# Patient Record
Sex: FEMALE
[De-identification: names, ages, dates, MRNs, and addresses within clinical notes are randomized per-mention and may not be internally consistent; named-entity substitution may affect disease eponyms.]

---

## 2019-07-05 ENCOUNTER — HOSPITAL ENCOUNTER (INPATIENT)
Dept: HOSPITAL 56 - MW.OB | Age: 19
LOS: 2 days | Discharge: HOME | End: 2019-07-07
Attending: OBSTETRICS & GYNECOLOGY | Admitting: OBSTETRICS & GYNECOLOGY
Payer: MEDICAID

## 2019-07-05 DIAGNOSIS — O34.211: Primary | ICD-10-CM

## 2019-07-05 DIAGNOSIS — E66.9: ICD-10-CM

## 2019-07-05 DIAGNOSIS — Z3A.39: ICD-10-CM

## 2019-07-05 RX ADMIN — KETOROLAC TROMETHAMINE SCH MG: 30 INJECTION, SOLUTION INTRAMUSCULAR at 15:01

## 2019-07-05 RX ADMIN — KETOROLAC TROMETHAMINE SCH MG: 30 INJECTION, SOLUTION INTRAMUSCULAR at 08:58

## 2019-07-05 RX ADMIN — KETOROLAC TROMETHAMINE SCH MG: 30 INJECTION, SOLUTION INTRAMUSCULAR at 20:53

## 2019-07-05 NOTE — PCM.LDHP
L&D History of Present Illness





- General


Date of Service: 19


Admit Problem/Dx: 


 Patient Status Order with Admit Dx/Problem





19 05:41


Patient Status [ADT] Routine 








 Admission Diagnosis/Problem











Admission Diagnosis/Problem    Pregnant - planned














Source of Information: Patient


History Limitations: Reports: No Limitations





- History of Present Illness


Improves with: Reports: None


Worsens with: Reports: None


Associated Symptoms: Reports: N





- Related Data


Allergies/Adverse Reactions: 


 Allergies











Allergy/AdvReac Type Severity Reaction Status Date / Time


 


No Known Allergies Allergy   Verified 19 08:21











Home Medications: 


 Home Meds





Calcium Carbonate [Tums] 1 tab.chew CHEW ASDIRECTED PRN 19 [History]


PNV95/Ferrous Fumarate/FA [Prenatal Vitamin Tablet] 1 tab PO DAILY 19 [

History]











Past Medical History


HEENT History: Reports: None


Cardiovascular History: Reports: None


Respiratory History: Reports: None


Gastrointestinal History: Reports: GERD


Other Gastrointestinal History: heartburn and reflux with pregnancy


Genitourinary History: Reports: None


OB/GYN History: Reports: Pregnancy


Musculoskeletal History: Reports: None


Neurological History: Reports: None


Psychiatric History: Reports: None


Endocrine/Metabolic History: Reports: Obesity/BMI 30+


Hematologic History: Reports: None


Immunologic History: Reports: None


Oncologic (Cancer) History: Reports: None


Dermatologic History: Reports: None





- Past Surgical History


Head Surgeries/Procedures: Reports: None


HEENT Surgical History: Reports: None


Cardiovascular Surgical History: Reports: None


Respiratory Surgical History: Reports: None


GI Surgical History: Reports: None


Female  Surgical History: Reports:  Section


Endocrine Surgical History: Reports: None


Neurological Surgical History: Reports: None


Musculoskeletal Surgical History: Reports: None


Oncologic Surgical History: Reports: None


Dermatological Surgical History: Reports: None





Social & Family History





- Tobacco Use


Smoking Status *Q: Never Smoker


Second Hand Smoke Exposure: Yes





- Caffeine Use


Caffeine Use: Reports: Coffee, Soda





- Recreational Drug Use


Recreational Drug Use: No





H&P Review of Systems





- Review of Systems:


Review Of Systems: See Below


General: Reports: No Symptoms


HEENT: Reports: No Symptoms


Pulmonary: Reports: No Symptoms


Cardiovascular: Reports: No Symptoms


Gastrointestinal: Reports: No Symptoms


Genitourinary: Reports: No Symptoms


Musculoskeletal: Reports: No Symptoms


Skin: Reports: No Symptoms


Psychiatric: Reports: No Symptoms


Neurological: Reports: No Symptoms


Hematologic/Lymphatic: Reports: No Symptoms


Immunologic: Reports: No Symptoms





L&D Exam





- Exam


Exam: See Below





- Vital Signs


Weight: 100.244 kg





- OB Specific


Fundal Height In cm: 38


Contraction Intensity: Mild


Fetal Movement: Active


Fetal Heart Tones: Present


Birth Presentation: Vertex





- Exam


General: Alert, Oriented


HEENT: PERRLA, Conjunctiva Clear, EACs Clear, EOMI, Hearing Intact, Mucosa 

Moist & Pink, Nares Patent, Normal Nasal Septum, Posterior Pharynx Clear, TMs 

Clear


Neck: Supple, Trachea Midline


Lungs: Clear to Auscultation, Normal Respiratory Effort


Cardiovascular: Regular Rate, Regular Rhythm


GI/Abdominal Exam: Normal Bowel Sounds, Soft, Non-Tender, No Organomegaly, No 

Distention, No Abnormal Bruit, No Mass, Pelvis Stable


Rectal Exam: Normal Exam, Normal Rectal Tone


Genitourinary: Normal external exam, Normal bimanual exam, Normal speculum exam


Back Exam: Normal Inspection, Full Range of Motion


Extremities: Normal Inspection, Normal Range of Motion, Non-Tender, No Pedal 

Edema, Normal Capillary Refill


Skin: Warm, Dry, Intact


Neurological: Cranial Nerves Intact, Reflexes Equal Bilateral


Psychiatric: Alert, Normal Affect, Normal Mood





- Patient Data


Lab Results Last 24 hrs: 


 Laboratory Results - last 24 hr











  19 Range/Units





  05:45 05:45 


 


WBC  8.63   (4.0-11.0)  K/uL


 


RBC  3.99 L   (4.30-5.90)  M/uL


 


Hgb  10.0 L   (12.0-16.0)  g/dL


 


Hct  32.4 L   (36.0-46.0)  %


 


MCV  81.2   (80.0-98.0)  fL


 


MCH  25.1 L   (27.0-32.0)  pg


 


MCHC  30.9 L   (31.0-37.0)  g/dL


 


RDW Std Deviation  47.2   (28.0-62.0)  fl


 


RDW Coeff of Tao  16 H   (11.0-15.0)  %


 


Plt Count  246   (150-400)  K/uL


 


MPV  10.30   (7.40-12.00)  fL


 


Nucleated RBC %  0.0   /100WBC


 


Nucleated RBCs #  0   K/uL


 


Blood Type   O POSITIVE  


 


Antibody Screen   NEGATIVE  











Result Diagrams: 


 19 05:45





Problem List Initiated/Reviewed/Updated: Yes


Orders Last 24hrs: 


 Active Orders 24 hr











 Category Date Time Status


 


 Patient Status [ADT] Routine ADT  19 05:41 Active


 


 Bradycardia-Neuroaxis [employer] [RC] ROUTINE Care  19 07:47 Ordered


 


 Fetal Non Stress Test [RC] PER UNIT ROUTINE Care  19 05:41 Active


 


 Hypertension-Neuroaxis [employer] [RC] ROUTINE Care  19 07:47 Ordered


 


 Hypotension-Neuroaxis [employer] [RC] ROUTINE Care  19 07:47 Ordered


 


 Notify Provider Vital Signs [RC] PRN Care  19 05:44 Active


 


 Oxygen Therapy [RC] PER UNIT ROUTINE Care  19 07:47 Ordered


 


 Procedure Site Prep Instruct [RC] ASDIRECTED Care  19 05:41 Active


 


 Up ad Mary [RC] ASDIRECTED Care  19 05:41 Active


 


 Verify Patient Consent Obtain [RC] ASDIRECTED Care  19 05:41 Active


 


 Vital Signs [RC] PER UNIT ROUTINE Care  19 05:41 Active


 


 Vital Signs [RC] Q1H Care  19 07:47 Ordered


 


 Acetaminophen/oxyCODONE [Percocet 325-5 MG] Med  19 07:47 Ordered





 2 tab PO Q6H PRN   


 


 Nalbuphine [Nubain] Med  19 07:47 Ordered





 5 mg IVPUSH ASDIRECTED PRN   


 


 Naloxone [Narcan] Med  19 07:47 Ordered





 0.1 mg IVPUSH ONETIME PRN   


 


 Ondansetron [Zofran] Med  19 07:47 Ordered





 4 mg IVPUSH Q6H PRN   


 


 Oxytocin/0.9 % Sodium Chloride [Oxytocin 30 Unit/500 ML Med  19 05:45 

Active





 -NS]   





 30 unit in 500 ml IV TITRATE   


 


 Sodium Chloride 0.9% [Normal Saline] Med  19 05:41 Active





 10 ml IV ASDIRECTED PRN   


 


 Sodium Chloride 0.9% [Saline Flush] Med  19 05:41 Active





 10 ml FLUSH ASDIRECTED PRN   


 


 diphenhydrAMINE [Benadryl] Med  19 07:47 Ordered





 25 mg IVPUSH Q4H PRN   


 


 fentaNYL [Sublimaze] Med  19 07:47 Ordered





 50 mcg IVPUSH Q1H PRN   


 


 Peripheral IV Insertion Adult [OM.PC] Routine Oth  19 05:41 Ordered


 


 Schedule Procedure [COMM] Per Unit Routine Oth  19 05:41 Ordered


 


 Resuscitation Status Routine Resus Stat  19 05:41 Ordered








 Medication Orders





Oxytocin/Sodium Chloride (Oxytocin 30 Unit/500 Ml-Ns)  30 unit in 500 mls @ 250 

mls/hr IV TITRATE ARJUN


Sodium Chloride (Saline Flush)  10 ml FLUSH ASDIRECTED PRN


   PRN Reason: Keep Vein Open


Sodium Chloride (Normal Saline)  10 ml IV ASDIRECTED PRN


   PRN Reason: IV Use








Assessment/Plan Comment:: 





IUP39+ admitted for elective repeat C/section.

## 2019-07-05 NOTE — OR
SURGEON:

Morris Armendariz MD

 

DATE OF PROCEDURE:

 

PREOPERATIVE DIAGNOSIS:

Intrauterine pregnancy, 39+ weeks, previous  section.

 

POSTOPERATIVE DIAGNOSIS:

Intrauterine pregnancy, 39+ weeks, previous  section.

 

OPERATION PERFORMED:

Repeat low-transverse  section.

 

PRIMARY SURGEON:

Morris Armendariz MD.

 

ASSISTANT:

OR tech.

 

ANESTHESIA:

Spinal, Dr. Shirley and Peg Gunn.

 

ESTIMATED BLOOD LOSS:

650 mL.

 

COMPLICATIONS:

None.

 

FINDINGS:

Male fetus.  Apgar score reported to be 8 and 9.  Normal uterus, tubes, and

ovary.

 

INDICATION FOR SURGERY:

This patient is 18.  She had previous  section.  She is admitted for

elective repeat  section.

 

PROCEDURE IN DETAIL:

The patient brought to the OR, properly identified, and after adequate level of

spinal anesthesia with a De Jesus catheter in the bladder, the patient was prepped

and draped in sterile fashion as usual.  Low transverse Pfannenstiel skin

incision was done.  Rommel's fascia and rectus fascia were opened in direction

of the incision.  The 2 recti muscles were  and peritoneal cavity was

entered.  Bladder flap was raised in the usual manner, pushing the bladder away

from the lower uterine segment.  Low transverse uterine incision was done,

extending mainly in the anterior as well as vertex position, delivered without

any problem, and the nurse resuscitator was present at the time of delivery.

The placenta delivered spontaneous, complete, and intact and then repair of the

lower uterine segment was done with 2-0 Vicryl continuous interlocking in 2

layers.  Reperitonealization done with 3-0 Vicryl continuous and then the

peritoneal cavity evacuated completely from all blood and blood clot and closed

with 3-0 Vicryl continuous.  The rectus fascia was closed with #1 PDS double

strand continuous; the Rommel's fascia with 3-0 Vicryl continuous; and the skin

closed with skin clips, Insorb, and Dermabond.  Instrument and sponge count was

correct.  The patient tolerated the procedure well, went to recovery room in

stable general condition.

 

 

LINA / ERENDIRA

DD:  2019 08:35:16

DT:  2019 12:58:12

Job #:  [id number]

## 2019-07-05 NOTE — PCM.PREANE
Preanesthetic Assessment





- Anesthesia/Transfusion/Family Hx


Anesthesia History: Prior Anesthesia Without Reaction


Family History of Anesthesia Reaction: No


Transfusion History: No Prior Transfusion(s)


Intubation History: Unknown





- Review of Systems


General: No Symptoms


Pulmonary: No Symptoms


Cardiovascular: No Symptoms


Gastrointestinal: No Symptoms


Neurological: No Symptoms


Other: Reports: None





- Physical Assessment


Height: 5 ft 2 in


Weight: 100.244 kg


ASA Class: 2


Mental Status: Alert & Oriented x3


Airway Class: Mallampati = 2


Dentition: Reports: Normal Dentition


Thyro-Mental Finger Breadths: 3


Mouth Opening Finger Breadths: 3


ROM/Head Extension: Full


Lungs: Clear to Auscultation, Normal Respiratory Effort


Cardiovascular: Regular Rate, Regular Rhythm





- Lab


Values: 





 Laboratory Last Values











WBC  8.63 K/uL (4.0-11.0)   19  05:45    


 


RBC  3.99 M/uL (4.30-5.90)  L  19  05:45    


 


Hgb  10.0 g/dL (12.0-16.0)  L  19  05:45    


 


Hct  32.4 % (36.0-46.0)  L  19  05:45    


 


MCV  81.2 fL (80.0-98.0)   19  05:45    


 


MCH  25.1 pg (27.0-32.0)  L  19  05:45    


 


MCHC  30.9 g/dL (31.0-37.0)  L  19  05:45    


 


RDW Std Deviation  47.2 fl (28.0-62.0)   19  05:45    


 


RDW Coeff of Tao  16 % (11.0-15.0)  H  19  05:45    


 


Plt Count  246 K/uL (150-400)   19  05:45    


 


MPV  10.30 fL (7.40-12.00)   19  05:45    


 


Nucleated RBC %  0.0 /100WBC  19  05:45    


 


Nucleated RBCs #  0 K/uL  19  05:45    














- Allergies


Allergies/Adverse Reactions: 


 Allergies











Allergy/AdvReac Type Severity Reaction Status Date / Time


 


No Known Allergies Allergy   Verified 19 08:21














- Blood


Blood Available: No





- Anesthesia Plan


Pre-Op Medication Ordered: None





- Acknowledgements


Anesthesia Type Planned: Spinal (general anesthesia back-up plan)


Pt an Appropriate Candidate for the Planned Anesthesia: Yes


Alternatives and Risks of Anesthesia Discussed w Pt/Guardian: Yes


Pt/Guardian Understands and Agrees with Anesthesia Plan: Yes





PreAnesthesia Questionnaire


HEENT History: Reports: None


Cardiovascular History: Reports: None


Respiratory History: Reports: None


Gastrointestinal History: Reports: GERD


Other Gastrointestinal History: heartburn and reflux with pregnancy


Genitourinary History: Reports: None


OB/GYN History: Reports: Pregnancy


Musculoskeletal History: Reports: None


Neurological History: Reports: None


Psychiatric History: Reports: None


Endocrine/Metabolic History: Reports: Obesity/BMI 30+


Hematologic History: Reports: None


Immunologic History: Reports: None


Oncologic (Cancer) History: Reports: None


Dermatologic History: Reports: None





- Past Surgical History


Head Surgeries/Procedures: Reports: None


HEENT Surgical History: Reports: None


Cardiovascular Surgical History: Reports: None


Respiratory Surgical History: Reports: None


GI Surgical History: Reports: None


Female  Surgical History: Reports:  Section


Endocrine Surgical History: Reports: None


Neurological Surgical History: Reports: None


Musculoskeletal Surgical History: Reports: None


Oncologic Surgical History: Reports: None


Dermatological Surgical History: Reports: None





- SUBSTANCE USE


Smoking Status *Q: Never Smoker


Tobacco Use Within Last Twelve Months: No


Second Hand Smoke Exposure: Yes


Recreational Drug Use History: No





- HOME MEDS


Home Medications: 


 Home Meds





Calcium Carbonate [Tums] 1 tab.chew CHEW ASDIRECTED PRN 19 [History]


PNV95/Ferrous Fumarate/FA [Prenatal Vitamin Tablet] 1 tab PO DAILY 19 [

History]











- CURRENT (IN HOUSE) MEDS


Current Meds: 





 Current Medications





Lactated Ringer's (Ringers, Lactated)  1,000 mls @ 500 mls/hr IV BOLUS ONE


   Stop: 19 07:44


   Last Admin: 19 05:57 Dose:  500 mls/hr


Oxytocin/Sodium Chloride (Oxytocin 30 Unit/500 Ml-Ns)  30 unit in 500 mls @ 250 

mls/hr IV TITRATE ARJUN


Sodium Chloride (Saline Flush)  10 ml FLUSH ASDIRECTED PRN


   PRN Reason: Keep Vein Open


Sodium Chloride (Normal Saline)  10 ml IV ASDIRECTED PRN


   PRN Reason: IV Use





Discontinued Medications





Citric Acid/Sodium Citrate (Bicitra Solution)  30 ml PO ONETIME ONE


   Stop: 19 05:42


Cefazolin Sodium/Dextrose 2 gm (/ Premix)  50 mls @ 100 mls/hr IV ONETIME ONE


   Stop: 19 06:10

## 2019-07-05 NOTE — PCM.OPNOTE
- General Post-Op/Procedure Note


Date of Surgery/Procedure: 07/05/19


Operative Procedure(s): IUP 39+wks Previous C/Section.


Pre Op Diagnosis: Repeat C/Sction


Post-Op Diagnosis: Same


Anesthesia Technique: Spinal


Primary Surgeon: Morris Armendariz


EBL in mLs: 650


Complications: None


Condition: Good

## 2019-07-06 RX ADMIN — KETOROLAC TROMETHAMINE SCH MG: 30 INJECTION, SOLUTION INTRAMUSCULAR at 02:55

## 2019-07-06 RX ADMIN — KETOROLAC TROMETHAMINE SCH MG: 30 INJECTION, SOLUTION INTRAMUSCULAR at 09:21

## 2019-07-06 RX ADMIN — OXYCODONE HYDROCHLORIDE AND ACETAMINOPHEN PRN TAB: 5; 325 TABLET ORAL at 16:13

## 2019-07-06 NOTE — [id number]
Post Anesthesia Note





- EVALUATION WITHIN 48HRS OF ANESTHETIC


Vital Signs in Normal Range: Yes


Patient Participated in Evaluation: Yes


Respiratory Function Stable: Yes


Airway Patent: Yes


Cardiovascular Function Stable: Yes


Hydration Status Stable: Yes


Pain Control Satisfactory: Yes


Nausea and Vomiting Control Satisfactory: Yes


Mental Status Recovered: Yes


Pulse Rate: 69


SaO2: 97 (RA)


Resp Rate: 17


Temperature: 97.7 F


Blood Pressure: 98/50

## 2019-07-06 NOTE — PCM.PNPP
- General Info


Date of Service: 19


Functional Status: Reports: Pain Controlled





- Review of Systems


General: Reports: No Symptoms


HEENT: Reports: No Symptoms


Pulmonary: Reports: No Symptoms


Cardiovascular: Reports: No Symptoms


Gastrointestinal: Reports: No Symptoms


Genitourinary: Reports: No Symptoms


Musculoskeletal: Reports: No Symptoms


Skin: Reports: No Symptoms


Neurological: Reports: No Symptoms


Psychiatric: Reports: No Symptoms





- General Info


Date of Service: 19





- Patient Data


Vital Signs - Most Recent: 


 Last Vital Signs











Temp  36.5 C   19 08:54


 


Pulse  69   19 08:54


 


Resp  17   19 08:54


 


BP  98/50 L  19 08:54


 


Pulse Ox  97   19 08:54











Weight - Most Recent: 100.244 kg


I&O - Last 24 Hours: 


 Intake & Output











 19





 22:59 06:59 14:59


 


Intake Total  1000 


 


Output Total 675 1050 


 


Balance -675 -50 











Lab Results - Last 24 Hours: 


 Laboratory Results - last 24 hr











  19 Range/Units





  05:52 


 


Hgb  8.8 L  (12.0-16.0)  g/dL


 


Hct  29.3 L  (36.0-46.0)  %











Med Orders - Current: 


 Current Medications





Bisacodyl (Dulcolax)  10 mg RECTAL ONETIME PRN


   PRN Reason: Constipation


Diphenhydramine HCl (Benadryl)  25 mg IVPUSH Q6H PRN


   PRN Reason: Itching or Nausea


Docusate Sodium (Colace)  100 mg PO BID [facility]


   Last Admin: 19 09:20 Dose:  100 mg


Emollient Ointment (Lansinoh Hpa)  0 gm TOP ASDIRECTED PRN


   PRN Reason: Sore Nipples


Fentanyl (Sublimaze)  50 mcg IVPUSH Q1H PRN


   PRN Reason: Pain (severe 7-10)


Oxytocin/Sodium Chloride (Oxytocin 30 Unit/500 Ml-Ns)  30 unit in 500 mls @ 250 

mls/hr IV TITRATE [facility]


Lactated Ringer's (Ringers, Lactated)  1,000 mls @ 125 mls/hr IV ASDIRECTED [facility]


   Last Admin: 19 15:00 Dose:  125 mls/hr


Ibuprofen (Motrin)  800 mg PO Q8H PRN


   PRN Reason: mild pain or fever


Nalbuphine HCl (Nubain)  5 mg IVPUSH ASDIRECTED PRN


   PRN Reason: Itching


Ondansetron HCl (Zofran)  4 mg IVPUSH Q6H PRN


   PRN Reason: Nausea


Ondansetron HCl (Zofran)  4 mg IVPUSH Q4H PRN


   PRN Reason: Nausea/Vomiting


   Last Admin: 19 11:49 Dose:  4 mg


Oxycodone/Acetaminophen (Percocet 325-5 Mg)  2 tab PO Q6H PRN


   PRN Reason: Pain (moderate 4-6)


Oxycodone/Acetaminophen (Percocet 325-5 Mg)  1 tab PO Q4H PRN


   PRN Reason: Pain (moderate 4-6)


Oxycodone/Acetaminophen (Percocet 325-5 Mg)  2 tab PO Q4H PRN


   PRN Reason: Pain (moderate 4-6)


Sodium Chloride (Saline Flush)  10 ml FLUSH ASDIRECTED PRN


   PRN Reason: Keep Vein Open


   Last Admin: 19 15:02 Dose:  10 ml


Sodium Chloride (Normal Saline)  10 ml IV ASDIRECTED PRN


   PRN Reason: IV Use





Discontinued Medications





Citric Acid/Sodium Citrate (Bicitra Solution)  30 ml PO ONETIME ONE


   Stop: 19 05:42


   Last Admin: 19 20:08 Dose:  Not Given


Diphenhydramine HCl (Benadryl)  25 mg IVPUSH Q4H PRN


   PRN Reason: Itching


   Stop: 19 07:47


Cefazolin Sodium/Dextrose 2 gm (/ Premix)  50 mls @ 100 mls/hr IV ONETIME ONE


   Stop: 19 06:10


   Last Admin: 19 20:07 Dose:  Not Given


Lactated Ringer's (Ringers, Lactated)  1,000 mls @ 500 mls/hr IV BOLUS ONE


   Stop: 19 07:44


   Last Admin: 19 06:58 Dose:  500 mls/hr


Ketorolac Tromethamine (Toradol)  30 mg IVPUSH Q6H ARJUN


   Stop: 19 08:31


   Last Admin: 19 09:21 Dose:  30 mg


Morphine Sulfate (Duramorph Pf) Confirm Administered Dose 10 mg .ROUTE .STK-MED 

ONE


   Stop: 19 07:23


Naloxone HCl (Narcan)  0.1 mg IVPUSH ONETIME PRN


   PRN Reason: Respiratory Depression


   Stop: 19 07:47


Octyl Cyanoacrylate (Dermabond Advance) Confirm Administered Dose 1 applic 

.ROUTE .STK-MED ONE


   Stop: 19 07:18


   Last Admin: 19 20:08 Dose:  Not Given


Ondansetron HCl (Zofran) Confirm Administered Dose 4 mg .ROUTE .STK-MED ONE


   Stop: 19 08:11


Oxytocin (Pitocin) Confirm Administered Dose 10 unit .ROUTE .STK-MED ONE


   Stop: 19 07:24


Oxytocin (Pitocin) Confirm Administered Dose 10 unit .ROUTE .STK-MED ONE


   Stop: 19 07:24


Oxytocin (Pitocin) Confirm Administered Dose 20 unit .ROUTE .STK-MED ONE


   Stop: 19 08:22


Promethazine HCl (Phenergan)  12.5 mg IM ONETIME ONE


   Stop: 19 13:06


   Last Admin: 19 13:36 Dose:  12.5 mg











- Infant Interaction


Infant Disposition, Postpartum: [place] in Room with Family


Infant Interaction: Holding Infant


Infant Feeding: Attempted Breastfeeding; Nursed Fair/Poor


Support Person: 





- Postpartum Recovery Exam


Fundal Tone: Firm


Fundal Level: 1 Fingerbreadths Below Umbilicus


Fundal Placement: Midline


Lochia Amount: Scant


Lochia Color: Rubra/Red


Perineum Description: Intact, Minimal Bruising/Swelling


Episiotomy/Laceration: None


Bladder Status: Voiding


Urinary Elimination: Voided





- Exam


General: Alert, Oriented


HEENT: Pupils Equal


Neck: Supple


Lungs: Clear to Auscultation, Normal Respiratory Effort


Cardiovascular: Regular Rate, Regular Rhythm


GI/Abdominal Exam: Normal Bowel Sounds, Soft, Non-Tender, No Organomegaly, No 

Distention, No Abnormal Bruit, No Mass, Pelvis Stable


Extremities: Normal Inspection, Normal Range of Motion, Non-Tender, No Pedal 

Edema, Normal Capillary Refill


Skin: Warm, Dry, Intact


Wound/Incisions: Healing Well


Neurological: No New Focal Deficit


Psy/Mental Status: Alert, Normal Affect, Normal Mood





- Problem List Review


Problem List Initiated/Reviewed/Updated: Yes





- Assessment


Assessment:: 





S/P C/section doing well.





- Plan


Plan:: 





IUP39+ admitted for elective repeat C/section.

## 2019-07-07 RX ADMIN — OXYCODONE HYDROCHLORIDE AND ACETAMINOPHEN PRN TAB: 5; 325 TABLET ORAL at 02:45

## 2019-07-07 NOTE — PCM.PNPP
- General Info


Date of Service: 19


Functional Status: Reports: Pain Controlled





- Review of Systems


General: Reports: No Symptoms


HEENT: Reports: No Symptoms


Pulmonary: Reports: No Symptoms


Cardiovascular: Reports: No Symptoms


Gastrointestinal: Reports: No Symptoms


Genitourinary: Reports: No Symptoms


Musculoskeletal: Reports: No Symptoms


Skin: Reports: No Symptoms


Neurological: Reports: No Symptoms


Psychiatric: Reports: No Symptoms





- General Info


Date of Service: 19





- Patient Data


Vital Signs - Most Recent: 


 Last Vital Signs











Temp  36.2 C   19 04:00


 


Pulse  69   19 04:00


 


Resp  15   19 04:00


 


BP  109/60   19 04:00


 


Pulse Ox  92 L  19 04:00











Weight - Most Recent: 100.244 kg


Med Orders - Current: 


 Current Medications





Bisacodyl (Dulcolax)  10 mg RECTAL ONETIME PRN


   PRN Reason: Constipation


Diphenhydramine HCl (Benadryl)  25 mg IVPUSH Q6H PRN


   PRN Reason: Itching or Nausea


Docusate Sodium (Colace)  100 mg PO BID [facility]


   Last Admin: 19 08:15 Dose:  100 mg


Emollient Ointment (Lansinoh Hpa)  0 gm TOP ASDIRECTED PRN


   PRN Reason: Sore Nipples


Fentanyl (Sublimaze)  50 mcg IVPUSH Q1H PRN


   PRN Reason: Pain (severe 7-10)


Oxytocin/Sodium Chloride (Oxytocin 30 Unit/500 Ml-Ns)  30 unit in 500 mls @ 250 

mls/hr IV TITRATE [facility]


Lactated Ringer's (Ringers, Lactated)  1,000 mls @ 125 mls/hr IV ASDIRECTED [facility]


   Last Admin: 19 15:00 Dose:  125 mls/hr


Ibuprofen (Motrin)  800 mg PO Q8H PRN


   PRN Reason: mild pain or fever


   Last Admin: 19 08:15 Dose:  800 mg


Nalbuphine HCl (Nubain)  5 mg IVPUSH ASDIRECTED PRN


   PRN Reason: Itching


Ondansetron HCl (Zofran)  4 mg IVPUSH Q6H PRN


   PRN Reason: Nausea


Ondansetron HCl (Zofran)  4 mg IVPUSH Q4H PRN


   PRN Reason: Nausea/Vomiting


   Last Admin: 19 11:49 Dose:  4 mg


Oxycodone/Acetaminophen (Percocet 325-5 Mg)  2 tab PO Q6H PRN


   PRN Reason: Pain (moderate 4-6)


Oxycodone/Acetaminophen (Percocet 325-5 Mg)  1 tab PO Q4H PRN


   PRN Reason: Pain (moderate 4-6)


   Last Admin: 19 21:43 Dose:  1 tab


Oxycodone/Acetaminophen (Percocet 325-5 Mg)  2 tab PO Q4H PRN


   PRN Reason: Pain (moderate 4-6)


   Last Admin: 19 02:45 Dose:  2 tab


Sodium Chloride (Saline Flush)  10 ml FLUSH ASDIRECTED PRN


   PRN Reason: Keep Vein Open


   Last Admin: 19 15:02 Dose:  10 ml


Sodium Chloride (Normal Saline)  10 ml IV ASDIRECTED PRN


   PRN Reason: IV Use





Discontinued Medications





Citric Acid/Sodium Citrate (Bicitra Solution)  30 ml PO ONETIME ONE


   Stop: 19 05:42


   Last Admin: 19 20:08 Dose:  Not Given


Diphenhydramine HCl (Benadryl)  25 mg IVPUSH Q4H PRN


   PRN Reason: Itching


   Stop: 19 07:47


Cefazolin Sodium/Dextrose 2 gm (/ Premix)  50 mls @ 100 mls/hr IV ONETIME ONE


   Stop: 19 06:10


   Last Admin: 19 20:07 Dose:  Not Given


Lactated Ringer's (Ringers, Lactated)  1,000 mls @ 500 mls/hr IV BOLUS ONE


   Stop: 19 07:44


   Last Admin: 19 06:58 Dose:  500 mls/hr


Ketorolac Tromethamine (Toradol)  30 mg IVPUSH Q6H [facility]


   Stop: 19 08:31


   Last Admin: 19 09:21 Dose:  30 mg


Morphine Sulfate (Duramorph Pf) Confirm Administered Dose 10 mg .ROUTE .STK-MED 

ONE


   Stop: 19 07:23


Naloxone HCl (Narcan)  0.1 mg IVPUSH ONETIME PRN


   PRN Reason: Respiratory Depression


   Stop: 19 07:47


Octyl Cyanoacrylate (Dermabond Advance) Confirm Administered Dose 1 applic 

.ROUTE .STK-MED ONE


   Stop: 19 07:18


   Last Admin: 19 20:08 Dose:  Not Given


Ondansetron HCl (Zofran) Confirm Administered Dose 4 mg .ROUTE .STK-MED ONE


   Stop: 19 08:11


Oxytocin (Pitocin) Confirm Administered Dose 10 unit .ROUTE .STK-MED ONE


   Stop: 19 07:24


Oxytocin (Pitocin) Confirm Administered Dose 10 unit .ROUTE .STK-MED ONE


   Stop: 19 07:24


Oxytocin (Pitocin) Confirm Administered Dose 20 unit .ROUTE .STK-MED ONE


   Stop: 19 08:22


Promethazine HCl (Phenergan)  12.5 mg IM ONETIME ONE


   Stop: 19 13:06


   Last Admin: 19 13:36 Dose:  12.5 mg











- Infant Interaction


Infant Disposition, Postpartum: [place] in Room with Family


Infant Interaction: Holding Infant


Infant Feeding: Attempted Breastfeeding; Nursed Fair/Poor


Support Person: 





- Postpartum Recovery Exam


Fundal Tone: Firm


Fundal Level: 2 Fingerbreadths Below Umbilicus


Fundal Placement: Midline


Lochia Amount: Scant


Lochia Color: Rubra/Red


Perineum Description: Intact, Minimal Bruising/Swelling


Episiotomy/Laceration: None


Bladder Status: Voiding


Urinary Elimination: Voided





- Exam


General: Alert, Oriented


HEENT: Pupils Equal


Neck: Supple


Lungs: Clear to Auscultation, Normal Respiratory Effort


Cardiovascular: Regular Rate, Regular Rhythm


GI/Abdominal Exam: Normal Bowel Sounds, Soft, Non-Tender, No Organomegaly, No 

Distention, No Abnormal Bruit, No Mass, Pelvis Stable


Extremities: Normal Inspection, Normal Range of Motion, Non-Tender, No Pedal 

Edema, Normal Capillary Refill


Skin: Warm, Dry, Intact


Wound/Incisions: Healing Well


Neurological: No New Focal Deficit


Psy/Mental Status: Alert, Normal Affect, Normal Mood





- Problem List Review


Problem List Initiated/Reviewed/Updated: Yes





- Assessment


Assessment:: 





S/P C/section doing well.





- Plan


Plan:: 





IUP39+ admitted for elective repeat C/section.

## 2019-07-07 NOTE — PCM.DCSUM1
**Discharge Summary





- Hospital Course


Diagnosis: Stroke: No





- Discharge Data


Discharge Date: 19


Discharge Disposition: Home, Self-Care 01


Condition: Good





- Patient Summary/Data


Operative Procedure(s) Performed: IUP 39+wks Previous C/Section.





- Patient Instructions


Activity: As Tolerated


Driving: Do Not Drive


Showering/Bathing: May Shower





- Discharge Plan


Home Medications: 


 Home Meds





Calcium Carbonate [Tums] 1 tab.chew CHEW ASDIRECTED PRN 19 [History]


PNV95/Ferrous Fumarate/FA [Prenatal Vitamin Tablet] 1 tab PO DAILY 19 [

History]








Patient Handouts:   Delivery, Care After


Referrals: 


[facility] [Outside]


Morris Armendariz MD [Physician] - 


(1 week-  @ 1:30pm w/ Dr. Armendariz


6 week-  @3:00pm w/ Dr. Armendariz )





- Discharge Summary/Plan Comment


DC Time >30 min.: Yes





- General Info


Date of Service: 19


Functional Status: Reports: Pain Controlled





- Review of Systems


General: Reports: No Symptoms


HEENT: Reports: No Symptoms


Pulmonary: Reports: No Symptoms


Cardiovascular: Reports: No Symptoms


Gastrointestinal: Reports: No Symptoms


Genitourinary: Reports: No Symptoms


Musculoskeletal: Reports: No Symptoms


Skin: Reports: No Symptoms


Neurological: Reports: No Symptoms


Psychiatric: Reports: No Symptoms





- Patient Data


Vitals - Most Recent: 


 Last Vital Signs











Temp  36.2 C   19 04:00


 


Pulse  69   19 04:00


 


Resp  15   19 04:00


 


BP  109/60   19 04:00


 


Pulse Ox  92 L  19 04:00











Weight - Most Recent: 100.244 kg


Med Orders - Current: 


 Current Medications





Bisacodyl (Dulcolax)  10 mg RECTAL ONETIME PRN


   PRN Reason: Constipation


Diphenhydramine HCl (Benadryl)  25 mg IVPUSH Q6H PRN


   PRN Reason: Itching or Nausea


Docusate Sodium (Colace)  100 mg PO BID [facility]


   Last Admin: 19 08:15 Dose:  100 mg


Emollient Ointment (Lansinoh Hpa)  0 gm TOP ASDIRECTED PRN


   PRN Reason: Sore Nipples


Fentanyl (Sublimaze)  50 mcg IVPUSH Q1H PRN


   PRN Reason: Pain (severe 7-10)


Oxytocin/Sodium Chloride (Oxytocin 30 Unit/500 Ml-Ns)  30 unit in 500 mls @ 250 

mls/hr IV TITRATE [facility]


Lactated Ringer's (Ringers, Lactated)  1,000 mls @ 125 mls/hr IV ASDIRECTED [facility]


   Last Admin: 19 15:00 Dose:  125 mls/hr


Ibuprofen (Motrin)  800 mg PO Q8H PRN


   PRN Reason: mild pain or fever


   Last Admin: 19 08:15 Dose:  800 mg


Nalbuphine HCl (Nubain)  5 mg IVPUSH ASDIRECTED PRN


   PRN Reason: Itching


Ondansetron HCl (Zofran)  4 mg IVPUSH Q6H PRN


   PRN Reason: Nausea


Ondansetron HCl (Zofran)  4 mg IVPUSH Q4H PRN


   PRN Reason: Nausea/Vomiting


   Last Admin: 19 11:49 Dose:  4 mg


Oxycodone/Acetaminophen (Percocet 325-5 Mg)  2 tab PO Q6H PRN


   PRN Reason: Pain (moderate 4-6)


Oxycodone/Acetaminophen (Percocet 325-5 Mg)  1 tab PO Q4H PRN


   PRN Reason: Pain (moderate 4-6)


   Last Admin: 19 21:43 Dose:  1 tab


Oxycodone/Acetaminophen (Percocet 325-5 Mg)  2 tab PO Q4H PRN


   PRN Reason: Pain (moderate 4-6)


   Last Admin: 19 02:45 Dose:  2 tab


Sodium Chloride (Saline Flush)  10 ml FLUSH ASDIRECTED PRN


   PRN Reason: Keep Vein Open


   Last Admin: 19 15:02 Dose:  10 ml


Sodium Chloride (Normal Saline)  10 ml IV ASDIRECTED PRN


   PRN Reason: IV Use





Discontinued Medications





Citric Acid/Sodium Citrate (Bicitra Solution)  30 ml PO ONETIME ONE


   Stop: 19 05:42


   Last Admin: 19 20:08 Dose:  Not Given


Diphenhydramine HCl (Benadryl)  25 mg IVPUSH Q4H PRN


   PRN Reason: Itching


   Stop: 19 07:47


Cefazolin Sodium/Dextrose 2 gm (/ Premix)  50 mls @ 100 mls/hr IV ONETIME ONE


   Stop: 19 06:10


   Last Admin: 19 20:07 Dose:  Not Given


Lactated Ringer's (Ringers, Lactated)  1,000 mls @ 500 mls/hr IV BOLUS ONE


   Stop: 19 07:44


   Last Admin: 19 06:58 Dose:  500 mls/hr


Ketorolac Tromethamine (Toradol)  30 mg IVPUSH Q6H [facility]


   Stop: 19 08:31


   Last Admin: 19 09:21 Dose:  30 mg


Morphine Sulfate (Duramorph Pf) Confirm Administered Dose 10 mg .ROUTE .STK-MED 

ONE


   Stop: 19 07:23


Naloxone HCl (Narcan)  0.1 mg IVPUSH ONETIME PRN


   PRN Reason: Respiratory Depression


   Stop: 19 07:47


Octyl Cyanoacrylate (Dermabond Advance) Confirm Administered Dose 1 applic 

.ROUTE .STK-MED ONE


   Stop: 19 07:18


   Last Admin: 19 20:08 Dose:  Not Given


Ondansetron HCl (Zofran) Confirm Administered Dose 4 mg .ROUTE .STK-MED ONE


   Stop: 19 08:11


Oxytocin (Pitocin) Confirm Administered Dose 10 unit .ROUTE .STK-MED ONE


   Stop: 19 07:24


Oxytocin (Pitocin) Confirm Administered Dose 10 unit .ROUTE .STK-MED ONE


   Stop: 19 07:24


Oxytocin (Pitocin) Confirm Administered Dose 20 unit .ROUTE .STK-MED ONE


   Stop: 19 08:22


Promethazine HCl (Phenergan)  12.5 mg IM ONETIME ONE


   Stop: 19 13:06


   Last Admin: 19 13:36 Dose:  12.5 mg











- Exam


General: Reports: Alert, Oriented


HEENT: Reports: Pupils Equal, Pupils Reactive, EOMI, Mucous Membr. Moist/Pink


Neck: Reports: Supple


Lungs: Reports: Clear to Auscultation, Normal Respiratory Effort


Cardiovascular: Reports: Regular Rate, Regular Rhythm


GI/Abdominal Exam: Normal Bowel Sounds, Soft, Non-Tender, No Organomegaly, No 

Distention, No Abnormal Bruit, No Mass, Pelvis Stable


 (Female) Exam: Normal External Exam, Normal Speculum Exam, Normal Bimanual 

Exam


Rectal (Female) Exam: Normal Exam, Normal Rectal Tone


Back Exam: Reports: Normal Inspection, Full Range of Motion


Extremities: Normal Inspection, Normal Range of Motion, Non-Tender, No Pedal 

Edema, Normal Capillary Refill


Skin: Reports: Warm, Dry, Intact


Wound/Incisions: Reports: Healing Well


Neurological: Reports: No New Focal Deficit


Psy/Mental Status: Reports: Alert, Normal Affect, Normal Mood